# Patient Record
(demographics unavailable — no encounter records)

---

## 2025-07-17 NOTE — PHYSICAL EXAM
[Normal Breath Sounds] : Normal breath sounds [Normal Heart Sounds] : normal heart sounds [Normal Rate and Rhythm] : normal rate and rhythm [No Rash or Lesion] : No rash or lesion [Alert] : alert [Oriented to Person] : oriented to person [Oriented to Place] : oriented to place [Oriented to Time] : oriented to time [Calm] : calm [de-identified] : Soft/ND [de-identified] : NAD [de-identified] : NC/AT [de-identified] : +ROM/JARVIS [de-identified] : Intact [FreeTextEntry1] : External anal exam-large right lateral and anterior midline skin tag soft nontender Digital rectal exam normal tone nontender no masses Anoscopy-procedure performed in standard fashion under direct vision with a standard adult anoscope.  Patient tolerated without event or complication. - Small internal hemorrhoids circumferentially no proctitis

## 2025-07-17 NOTE — ASSESSMENT
[FreeTextEntry1] : External hemorrhoids - Patient reports intermittent irritation and difficulty with hygiene from large external hemorrhoids/skin tags - We discussed treatment options including excision and observation.  Patient reports symptoms affect her life enough that she would like to proceed with excision - Risks and benefits were reviewed - All questions were answered - Patient wishes to proceed and will schedule at her earliest convenience

## 2025-07-17 NOTE — CONSULT LETTER
[Dear  ___] : Dear  [unfilled], [Consult Letter:] : I had the pleasure of evaluating your patient, [unfilled]. [Please see my note below.] : Please see my note below. [Consult Closing:] : Thank you very much for allowing me to participate in the care of this patient.  If you have any questions, please do not hesitate to contact me. [Sincerely,] : Sincerely, [FreeTextEntry2] : Reilly Love [FreeTextEntry3] : Flakito Gallardo MD FACS Chief Colon and Rectal Surgery [facility]

## 2025-07-17 NOTE — HISTORY OF PRESENT ILLNESS
[FreeTextEntry1] : 45 y/o female presents here for hemorrhoids.   Patient states she developed a thrombosed hemorrhoid about a month ago. She reports that it has healed, but she developed a skin tag from it. She would like to know if it's possible to get it removed. Daily BM without straining. Patient denies abd pain, nausea/vomiting, constipation/diarrhea, rectal bleeding and weight loss.   Referred by MD Love  Patient has never had a colonoscopy.